# Patient Record
Sex: FEMALE | Race: WHITE | NOT HISPANIC OR LATINO | URBAN - METROPOLITAN AREA
[De-identification: names, ages, dates, MRNs, and addresses within clinical notes are randomized per-mention and may not be internally consistent; named-entity substitution may affect disease eponyms.]

---

## 2017-06-08 ENCOUNTER — FOLLOW UP (OUTPATIENT)
Dept: URBAN - METROPOLITAN AREA CLINIC 32 | Facility: CLINIC | Age: 62
End: 2017-06-08

## 2017-06-08 DIAGNOSIS — H35.3112: ICD-10-CM

## 2017-06-08 DIAGNOSIS — H35.373: ICD-10-CM

## 2017-06-08 DIAGNOSIS — H35.353: ICD-10-CM

## 2017-06-08 DIAGNOSIS — H35.3222: ICD-10-CM

## 2017-06-08 PROCEDURE — 92226 OPHTHALMOSCOPY (SUB): CPT

## 2017-06-08 PROCEDURE — 92134 CPTRZ OPH DX IMG PST SGM RTA: CPT

## 2017-06-08 PROCEDURE — 4177F TALK PT/CRGVR RE AREDS PREV: CPT

## 2017-06-08 PROCEDURE — 2019F DILATED MACUL EXAM DONE: CPT

## 2017-06-08 PROCEDURE — 1036F TOBACCO NON-USER: CPT

## 2017-06-08 PROCEDURE — C9257 BEVACIZUMAB INJECTION: HCPCS

## 2017-06-08 PROCEDURE — 92014 COMPRE OPH EXAM EST PT 1/>: CPT | Mod: 25

## 2017-06-08 PROCEDURE — 67028 INJECTION EYE DRUG: CPT

## 2017-06-08 ASSESSMENT — VISUAL ACUITY
OS_CC: 20/60-3
OD_CC: 20/30-2

## 2017-06-08 ASSESSMENT — TONOMETRY
OS_IOP_MMHG: 15
OD_IOP_MMHG: 16

## 2017-06-29 ENCOUNTER — FOLLOW UP (OUTPATIENT)
Dept: URBAN - METROPOLITAN AREA CLINIC 32 | Facility: CLINIC | Age: 62
End: 2017-06-29

## 2017-06-29 DIAGNOSIS — H35.3112: ICD-10-CM

## 2017-06-29 DIAGNOSIS — H35.373: ICD-10-CM

## 2017-06-29 DIAGNOSIS — H35.353: ICD-10-CM

## 2017-06-29 DIAGNOSIS — H35.3222: ICD-10-CM

## 2017-06-29 PROCEDURE — 92134 CPTRZ OPH DX IMG PST SGM RTA: CPT

## 2017-06-29 PROCEDURE — G8427 DOCREV CUR MEDS BY ELIG CLIN: HCPCS

## 2017-06-29 PROCEDURE — 1036F TOBACCO NON-USER: CPT

## 2017-06-29 PROCEDURE — 4177F TALK PT/CRGVR RE AREDS PREV: CPT

## 2017-06-29 PROCEDURE — 2019F DILATED MACUL EXAM DONE: CPT

## 2017-06-29 PROCEDURE — 92226 OPHTHALMOSCOPY (SUB): CPT

## 2017-06-29 PROCEDURE — 92014 COMPRE OPH EXAM EST PT 1/>: CPT

## 2017-06-29 PROCEDURE — 67028 INJECTION EYE DRUG: CPT

## 2017-06-29 ASSESSMENT — TONOMETRY
OS_IOP_MMHG: 21
OD_IOP_MMHG: 20

## 2017-06-29 ASSESSMENT — VISUAL ACUITY
OD_CC: 20/50-1
OS_CC: 20/100
OS_PH: 20/80

## 2017-07-06 ENCOUNTER — FOLLOW UP (OUTPATIENT)
Dept: URBAN - METROPOLITAN AREA CLINIC 32 | Facility: CLINIC | Age: 62
End: 2017-07-06

## 2017-07-06 DIAGNOSIS — H35.3112: ICD-10-CM

## 2017-07-06 DIAGNOSIS — H35.353: ICD-10-CM

## 2017-07-06 DIAGNOSIS — H35.3222: ICD-10-CM

## 2017-07-06 PROCEDURE — G8427 DOCREV CUR MEDS BY ELIG CLIN: HCPCS

## 2017-07-06 PROCEDURE — 92226 OPHTHALMOSCOPY (SUB): CPT

## 2017-07-06 PROCEDURE — 1036F TOBACCO NON-USER: CPT

## 2017-07-06 PROCEDURE — 92014 COMPRE OPH EXAM EST PT 1/>: CPT | Mod: 25

## 2017-07-06 PROCEDURE — 2019F DILATED MACUL EXAM DONE: CPT

## 2017-07-06 PROCEDURE — 4040F PNEUMOC VAC/ADMIN/RCVD: CPT | Mod: 8P

## 2017-07-06 PROCEDURE — 67028 INJECTION EYE DRUG: CPT

## 2017-07-06 PROCEDURE — 4177F TALK PT/CRGVR RE AREDS PREV: CPT

## 2017-07-06 PROCEDURE — 92134 CPTRZ OPH DX IMG PST SGM RTA: CPT

## 2017-07-06 ASSESSMENT — VISUAL ACUITY
OS_CC: 20/100-1
OS_PH: 20/80-3
OD_CC: 20/30-1

## 2017-07-06 ASSESSMENT — TONOMETRY
OS_IOP_MMHG: 14
OD_IOP_MMHG: 12

## 2017-07-13 ENCOUNTER — DIAGNOSTICS ONLY (OUTPATIENT)
Dept: URBAN - METROPOLITAN AREA CLINIC 32 | Facility: CLINIC | Age: 62
End: 2017-07-13

## 2017-07-13 DIAGNOSIS — H35.353: ICD-10-CM

## 2017-07-13 PROCEDURE — 92134 CPTRZ OPH DX IMG PST SGM RTA: CPT

## 2017-09-20 ENCOUNTER — FOLLOW UP (OUTPATIENT)
Dept: URBAN - METROPOLITAN AREA CLINIC 32 | Facility: CLINIC | Age: 62
End: 2017-09-20

## 2017-09-20 DIAGNOSIS — H35.353: ICD-10-CM

## 2017-09-20 DIAGNOSIS — H35.3112: ICD-10-CM

## 2017-09-20 DIAGNOSIS — H35.3222: ICD-10-CM

## 2017-09-20 DIAGNOSIS — H35.373: ICD-10-CM

## 2017-09-20 PROCEDURE — G8427 DOCREV CUR MEDS BY ELIG CLIN: HCPCS

## 2017-09-20 PROCEDURE — 2019F DILATED MACUL EXAM DONE: CPT

## 2017-09-20 PROCEDURE — 92134 CPTRZ OPH DX IMG PST SGM RTA: CPT

## 2017-09-20 PROCEDURE — 92014 COMPRE OPH EXAM EST PT 1/>: CPT

## 2017-09-20 PROCEDURE — 92226 OPHTHALMOSCOPY (SUB): CPT

## 2017-09-20 PROCEDURE — 1036F TOBACCO NON-USER: CPT

## 2017-09-20 PROCEDURE — 4177F TALK PT/CRGVR RE AREDS PREV: CPT

## 2017-09-20 ASSESSMENT — VISUAL ACUITY
OS_PH: 20/80
OS_CC: 20/100
OD_CC: 20/50
OD_PH: 20/40

## 2017-09-20 ASSESSMENT — TONOMETRY
OS_IOP_MMHG: 20
OD_IOP_MMHG: 20

## 2017-09-26 ENCOUNTER — FOLLOW UP (OUTPATIENT)
Dept: URBAN - METROPOLITAN AREA CLINIC 32 | Facility: CLINIC | Age: 62
End: 2017-09-26

## 2017-09-26 DIAGNOSIS — H35.353: ICD-10-CM

## 2017-09-26 PROCEDURE — 67028 INJECTION EYE DRUG: CPT

## 2017-09-26 PROCEDURE — 92134 CPTRZ OPH DX IMG PST SGM RTA: CPT

## 2017-09-26 ASSESSMENT — VISUAL ACUITY
OS_PH: 20/80-1
OS_CC: 20/125
OD_PH: 20/40-1
OD_CC: 20/50-2

## 2017-09-26 ASSESSMENT — TONOMETRY
OS_IOP_MMHG: 16
OD_IOP_MMHG: 15

## 2017-10-04 ENCOUNTER — FOLLOW UP (OUTPATIENT)
Dept: URBAN - METROPOLITAN AREA CLINIC 32 | Facility: CLINIC | Age: 62
End: 2017-10-04

## 2017-10-04 DIAGNOSIS — H35.341: ICD-10-CM

## 2017-10-04 DIAGNOSIS — H35.353: ICD-10-CM

## 2017-10-04 DIAGNOSIS — H35.373: ICD-10-CM

## 2017-10-04 PROCEDURE — 1036F TOBACCO NON-USER: CPT

## 2017-10-04 PROCEDURE — 92134 CPTRZ OPH DX IMG PST SGM RTA: CPT

## 2017-10-04 PROCEDURE — G8427 DOCREV CUR MEDS BY ELIG CLIN: HCPCS

## 2017-10-04 PROCEDURE — 92226 OPHTHALMOSCOPY (SUB): CPT

## 2017-10-04 PROCEDURE — 67028 INJECTION EYE DRUG: CPT

## 2017-10-04 PROCEDURE — 92014 COMPRE OPH EXAM EST PT 1/>: CPT | Mod: 25

## 2017-10-04 ASSESSMENT — TONOMETRY
OS_IOP_MMHG: 20
OD_IOP_MMHG: 20

## 2017-10-04 ASSESSMENT — VISUAL ACUITY
OS_CC: 20/100
OD_CC: 20/50
OS_PH: 20/60
OD_PH: 20/40

## 2017-11-15 ENCOUNTER — FOLLOW UP (OUTPATIENT)
Dept: URBAN - METROPOLITAN AREA CLINIC 32 | Facility: CLINIC | Age: 62
End: 2017-11-15

## 2017-11-15 DIAGNOSIS — H35.353: ICD-10-CM

## 2017-11-15 DIAGNOSIS — H35.373: ICD-10-CM

## 2017-11-15 DIAGNOSIS — H35.341: ICD-10-CM

## 2017-11-15 PROCEDURE — 92134 CPTRZ OPH DX IMG PST SGM RTA: CPT

## 2017-11-15 PROCEDURE — 92226 OPHTHALMOSCOPY (SUB): CPT

## 2017-11-15 PROCEDURE — 1036F TOBACCO NON-USER: CPT

## 2017-11-15 PROCEDURE — G8427 DOCREV CUR MEDS BY ELIG CLIN: HCPCS

## 2017-11-15 PROCEDURE — 92014 COMPRE OPH EXAM EST PT 1/>: CPT | Mod: 25

## 2017-11-15 PROCEDURE — 67515 INJECT/TREAT EYE SOCKET: CPT

## 2017-11-15 ASSESSMENT — TONOMETRY
OS_IOP_MMHG: 17
OD_IOP_MMHG: 17

## 2017-11-15 ASSESSMENT — VISUAL ACUITY
OS_CC: 20/80
OD_CC: 20/60-3

## 2017-11-20 ENCOUNTER — SURGERY/PROCEDURE (OUTPATIENT)
Age: 62
End: 2017-11-20

## 2017-11-20 DIAGNOSIS — H35.373: ICD-10-CM

## 2017-11-20 DIAGNOSIS — H35.341: ICD-10-CM

## 2017-11-20 PROCEDURE — 67042 VIT FOR MACULAR HOLE: CPT

## 2017-11-21 ENCOUNTER — FOLLOW UP (OUTPATIENT)
Dept: URBAN - METROPOLITAN AREA CLINIC 14 | Facility: CLINIC | Age: 62
End: 2017-11-21

## 2017-11-21 DIAGNOSIS — H35.373: ICD-10-CM

## 2017-11-21 DIAGNOSIS — H35.341: ICD-10-CM

## 2017-11-21 PROCEDURE — 99024 POSTOP FOLLOW-UP VISIT: CPT

## 2017-11-21 PROCEDURE — 92226 OPHTHALMOSCOPY (SUB): CPT

## 2017-11-21 ASSESSMENT — TONOMETRY
OS_IOP_MMHG: 16
OD_IOP_MMHG: 6
OD_IOP_MMHG: 8

## 2017-11-21 ASSESSMENT — VISUAL ACUITY: OS_CC: 20/125

## 2017-11-28 ENCOUNTER — FOLLOW UP (OUTPATIENT)
Dept: URBAN - METROPOLITAN AREA CLINIC 14 | Facility: CLINIC | Age: 62
End: 2017-11-28

## 2017-11-28 DIAGNOSIS — H35.341: ICD-10-CM

## 2017-11-28 DIAGNOSIS — H35.373: ICD-10-CM

## 2017-11-28 PROCEDURE — 92134 CPTRZ OPH DX IMG PST SGM RTA: CPT

## 2017-11-28 PROCEDURE — 99024 POSTOP FOLLOW-UP VISIT: CPT

## 2017-11-28 PROCEDURE — 92226 OPHTHALMOSCOPY (SUB): CPT

## 2017-11-28 ASSESSMENT — VISUAL ACUITY
OS_PH: 20/80-2
OD_CC: 20/125
OS_CC: 20/125

## 2017-11-28 ASSESSMENT — TONOMETRY
OS_IOP_MMHG: 16
OD_IOP_MMHG: 13

## 2017-12-05 ENCOUNTER — FOLLOW UP (OUTPATIENT)
Dept: URBAN - METROPOLITAN AREA CLINIC 14 | Facility: CLINIC | Age: 62
End: 2017-12-05

## 2017-12-05 DIAGNOSIS — H35.353: ICD-10-CM

## 2017-12-05 DIAGNOSIS — H35.373: ICD-10-CM

## 2017-12-05 DIAGNOSIS — H35.341: ICD-10-CM

## 2017-12-05 PROCEDURE — 99024 POSTOP FOLLOW-UP VISIT: CPT

## 2017-12-05 PROCEDURE — 92226 OPHTHALMOSCOPY (SUB): CPT

## 2017-12-05 PROCEDURE — 92134 CPTRZ OPH DX IMG PST SGM RTA: CPT

## 2017-12-05 ASSESSMENT — TONOMETRY
OD_IOP_MMHG: 21
OS_IOP_MMHG: 20

## 2017-12-05 ASSESSMENT — VISUAL ACUITY
OS_CC: 20/100-2
OD_CC: 20/100
OD_PH: 20/100+2
OS_PH: 20/50-3

## 2017-12-19 ENCOUNTER — POST-OP CHECK (OUTPATIENT)
Dept: URBAN - METROPOLITAN AREA CLINIC 14 | Facility: CLINIC | Age: 62
End: 2017-12-19

## 2017-12-19 DIAGNOSIS — H35.353: ICD-10-CM

## 2017-12-19 DIAGNOSIS — H35.341: ICD-10-CM

## 2017-12-19 DIAGNOSIS — H35.3222: ICD-10-CM

## 2017-12-19 DIAGNOSIS — H35.3112: ICD-10-CM

## 2017-12-19 DIAGNOSIS — H43.812: ICD-10-CM

## 2017-12-19 DIAGNOSIS — H35.373: ICD-10-CM

## 2017-12-19 PROCEDURE — 99024 POSTOP FOLLOW-UP VISIT: CPT

## 2017-12-19 PROCEDURE — 92134 CPTRZ OPH DX IMG PST SGM RTA: CPT

## 2017-12-19 PROCEDURE — 92226 OPHTHALMOSCOPY (SUB): CPT

## 2017-12-19 ASSESSMENT — VISUAL ACUITY
OS_PH: 20/80
OS_CC: 20/125
OD_CC: 20/100+2
OD_PH: 20/60

## 2017-12-19 ASSESSMENT — TONOMETRY
OS_IOP_MMHG: 20
OD_IOP_MMHG: 22

## 2018-05-16 ENCOUNTER — POST-OP CHECK (OUTPATIENT)
Dept: URBAN - METROPOLITAN AREA CLINIC 32 | Facility: CLINIC | Age: 63
End: 2018-05-16

## 2018-05-16 DIAGNOSIS — H35.341: ICD-10-CM

## 2018-05-16 DIAGNOSIS — H35.353: ICD-10-CM

## 2018-05-16 DIAGNOSIS — H35.373: ICD-10-CM

## 2018-05-16 PROCEDURE — 99024 POSTOP FOLLOW-UP VISIT: CPT | Mod: 25

## 2018-05-16 PROCEDURE — 92226 OPHTHALMOSCOPY (SUB): CPT

## 2018-05-16 PROCEDURE — 67515 INJECT/TREAT EYE SOCKET: CPT

## 2018-05-16 PROCEDURE — 92134 CPTRZ OPH DX IMG PST SGM RTA: CPT

## 2018-05-16 ASSESSMENT — VISUAL ACUITY
OS_SC: 20/50-1
OD_SC: 20/50-1
OD_PH: 20/40-3

## 2018-05-16 ASSESSMENT — TONOMETRY
OD_IOP_MMHG: 18
OS_IOP_MMHG: 21

## 2018-05-29 ENCOUNTER — FOLLOW UP (OUTPATIENT)
Dept: URBAN - METROPOLITAN AREA CLINIC 14 | Facility: CLINIC | Age: 63
End: 2018-05-29

## 2018-05-29 DIAGNOSIS — H35.341: ICD-10-CM

## 2018-05-29 DIAGNOSIS — H35.353: ICD-10-CM

## 2018-05-29 DIAGNOSIS — H35.373: ICD-10-CM

## 2018-05-29 PROCEDURE — 1036F TOBACCO NON-USER: CPT

## 2018-05-29 PROCEDURE — 92226 OPHTHALMOSCOPY (SUB): CPT

## 2018-05-29 PROCEDURE — 67515 INJECT/TREAT EYE SOCKET: CPT

## 2018-05-29 PROCEDURE — 92134 CPTRZ OPH DX IMG PST SGM RTA: CPT

## 2018-05-29 PROCEDURE — 4040F PNEUMOC VAC/ADMIN/RCVD: CPT | Mod: 8P

## 2018-05-29 PROCEDURE — 92014 COMPRE OPH EXAM EST PT 1/>: CPT | Mod: 25

## 2018-05-29 PROCEDURE — G8427 DOCREV CUR MEDS BY ELIG CLIN: HCPCS

## 2018-05-29 ASSESSMENT — VISUAL ACUITY
OS_SC: 20/50
OD_SC: 20/25

## 2018-05-29 ASSESSMENT — TONOMETRY
OD_IOP_MMHG: 20
OS_IOP_MMHG: 17

## 2018-06-12 ENCOUNTER — FOLLOW UP (OUTPATIENT)
Dept: URBAN - METROPOLITAN AREA CLINIC 14 | Facility: CLINIC | Age: 63
End: 2018-06-12

## 2018-06-12 DIAGNOSIS — H43.812: ICD-10-CM

## 2018-06-12 DIAGNOSIS — H35.3222: ICD-10-CM

## 2018-06-12 DIAGNOSIS — H35.373: ICD-10-CM

## 2018-06-12 DIAGNOSIS — H35.341: ICD-10-CM

## 2018-06-12 DIAGNOSIS — H35.353: ICD-10-CM

## 2018-06-12 DIAGNOSIS — H35.3112: ICD-10-CM

## 2018-06-12 PROCEDURE — 1036F TOBACCO NON-USER: CPT

## 2018-06-12 PROCEDURE — G8427 DOCREV CUR MEDS BY ELIG CLIN: HCPCS

## 2018-06-12 PROCEDURE — 4177F TALK PT/CRGVR RE AREDS PREV: CPT

## 2018-06-12 PROCEDURE — 92014 COMPRE OPH EXAM EST PT 1/>: CPT | Mod: 25

## 2018-06-12 PROCEDURE — 2019F DILATED MACUL EXAM DONE: CPT

## 2018-06-12 PROCEDURE — 92226 OPHTHALMOSCOPY (SUB): CPT

## 2018-06-12 PROCEDURE — 67515 INJECT/TREAT EYE SOCKET: CPT

## 2018-06-12 PROCEDURE — 92134 CPTRZ OPH DX IMG PST SGM RTA: CPT

## 2018-06-12 ASSESSMENT — TONOMETRY
OD_IOP_MMHG: 18
OS_IOP_MMHG: 13

## 2018-06-12 ASSESSMENT — VISUAL ACUITY
OD_SC: 20/30-3
OS_SC: 20/50

## 2018-06-26 ENCOUNTER — FOLLOW UP (OUTPATIENT)
Dept: URBAN - METROPOLITAN AREA CLINIC 14 | Facility: CLINIC | Age: 63
End: 2018-06-26

## 2018-06-26 DIAGNOSIS — H35.353: ICD-10-CM

## 2018-06-26 DIAGNOSIS — H35.3222: ICD-10-CM

## 2018-06-26 DIAGNOSIS — H43.812: ICD-10-CM

## 2018-06-26 DIAGNOSIS — H35.373: ICD-10-CM

## 2018-06-26 DIAGNOSIS — H35.3112: ICD-10-CM

## 2018-06-26 DIAGNOSIS — H35.341: ICD-10-CM

## 2018-06-26 PROCEDURE — 1036F TOBACCO NON-USER: CPT

## 2018-06-26 PROCEDURE — 4177F TALK PT/CRGVR RE AREDS PREV: CPT

## 2018-06-26 PROCEDURE — 92134 CPTRZ OPH DX IMG PST SGM RTA: CPT

## 2018-06-26 PROCEDURE — 4040F PNEUMOC VAC/ADMIN/RCVD: CPT | Mod: 8P

## 2018-06-26 PROCEDURE — G8427 DOCREV CUR MEDS BY ELIG CLIN: HCPCS

## 2018-06-26 PROCEDURE — 92014 COMPRE OPH EXAM EST PT 1/>: CPT

## 2018-06-26 PROCEDURE — 92226 OPHTHALMOSCOPY (SUB): CPT

## 2018-06-26 PROCEDURE — 2019F DILATED MACUL EXAM DONE: CPT

## 2018-06-26 PROCEDURE — 67515 INJECT/TREAT EYE SOCKET: CPT

## 2018-06-26 ASSESSMENT — VISUAL ACUITY
OS_SC: 20/30-1
OD_SC: 20/30+3

## 2018-06-26 ASSESSMENT — TONOMETRY
OD_IOP_MMHG: 20
OS_IOP_MMHG: 18

## 2018-07-10 ENCOUNTER — FOLLOW UP (OUTPATIENT)
Dept: URBAN - METROPOLITAN AREA CLINIC 14 | Facility: CLINIC | Age: 63
End: 2018-07-10

## 2018-07-10 DIAGNOSIS — H44.113: ICD-10-CM

## 2018-07-10 DIAGNOSIS — H35.3222: ICD-10-CM

## 2018-07-10 DIAGNOSIS — H35.3112: ICD-10-CM

## 2018-07-10 DIAGNOSIS — H35.353: ICD-10-CM

## 2018-07-10 PROCEDURE — 4177F TALK PT/CRGVR RE AREDS PREV: CPT

## 2018-07-10 PROCEDURE — G8427 DOCREV CUR MEDS BY ELIG CLIN: HCPCS

## 2018-07-10 PROCEDURE — 1036F TOBACCO NON-USER: CPT

## 2018-07-10 PROCEDURE — 92134 CPTRZ OPH DX IMG PST SGM RTA: CPT

## 2018-07-10 PROCEDURE — 92226 OPHTHALMOSCOPY (SUB): CPT

## 2018-07-10 PROCEDURE — 67028 INJECTION EYE DRUG: CPT

## 2018-07-10 PROCEDURE — 2019F DILATED MACUL EXAM DONE: CPT

## 2018-07-10 PROCEDURE — 92012 INTRM OPH EXAM EST PATIENT: CPT | Mod: 25

## 2018-07-10 ASSESSMENT — VISUAL ACUITY
OS_SC: 20/30-2
OD_SC: 20/25-1

## 2018-07-10 ASSESSMENT — TONOMETRY
OS_IOP_MMHG: 15
OD_IOP_MMHG: 16

## 2018-08-09 ENCOUNTER — FOLLOW UP (OUTPATIENT)
Dept: URBAN - METROPOLITAN AREA CLINIC 32 | Facility: CLINIC | Age: 63
End: 2018-08-09

## 2018-08-09 DIAGNOSIS — H35.353: ICD-10-CM

## 2018-08-09 DIAGNOSIS — H35.3222: ICD-10-CM

## 2018-08-09 DIAGNOSIS — H44.113: ICD-10-CM

## 2018-08-09 DIAGNOSIS — H35.3112: ICD-10-CM

## 2018-08-09 PROCEDURE — 1036F TOBACCO NON-USER: CPT

## 2018-08-09 PROCEDURE — 92226 OPHTHALMOSCOPY (SUB): CPT

## 2018-08-09 PROCEDURE — G8427 DOCREV CUR MEDS BY ELIG CLIN: HCPCS

## 2018-08-09 PROCEDURE — 2019F DILATED MACUL EXAM DONE: CPT

## 2018-08-09 PROCEDURE — 92014 COMPRE OPH EXAM EST PT 1/>: CPT

## 2018-08-09 PROCEDURE — 92134 CPTRZ OPH DX IMG PST SGM RTA: CPT

## 2018-08-09 PROCEDURE — 4177F TALK PT/CRGVR RE AREDS PREV: CPT

## 2018-08-09 ASSESSMENT — TONOMETRY
OD_IOP_MMHG: 23
OD_IOP_MMHG: 25
OS_IOP_MMHG: 22

## 2018-08-09 ASSESSMENT — VISUAL ACUITY
OD_SC: 20/25+2
OS_PH: 20/20-1
OS_SC: 20/30-3

## 2018-09-18 ENCOUNTER — FOLLOW UP (OUTPATIENT)
Dept: URBAN - METROPOLITAN AREA CLINIC 32 | Facility: CLINIC | Age: 63
End: 2018-09-18

## 2018-09-18 DIAGNOSIS — H35.3112: ICD-10-CM

## 2018-09-18 DIAGNOSIS — H35.3222: ICD-10-CM

## 2018-09-18 DIAGNOSIS — H35.353: ICD-10-CM

## 2018-09-18 DIAGNOSIS — H44.113: ICD-10-CM

## 2018-09-18 PROCEDURE — 92134 CPTRZ OPH DX IMG PST SGM RTA: CPT

## 2018-09-18 PROCEDURE — G9903 PT SCRN TBCO ID AS NON USER: HCPCS

## 2018-09-18 PROCEDURE — 92014 COMPRE OPH EXAM EST PT 1/>: CPT | Mod: 25

## 2018-09-18 PROCEDURE — G8427 DOCREV CUR MEDS BY ELIG CLIN: HCPCS

## 2018-09-18 PROCEDURE — 4177F TALK PT/CRGVR RE AREDS PREV: CPT

## 2018-09-18 PROCEDURE — 92226 OPHTHALMOSCOPY (SUB): CPT

## 2018-09-18 PROCEDURE — G9974 MAC EXAM PERF: HCPCS

## 2018-09-18 PROCEDURE — 67028 INJECTION EYE DRUG: CPT

## 2018-09-18 PROCEDURE — 1036F TOBACCO NON-USER: CPT

## 2018-09-18 ASSESSMENT — TONOMETRY
OS_IOP_MMHG: 23
OD_IOP_MMHG: 22

## 2018-09-18 ASSESSMENT — VISUAL ACUITY
OS_SC: 20/60+3
OD_SC: 20/40+2

## 2018-10-16 ENCOUNTER — FOLLOW UP (OUTPATIENT)
Dept: URBAN - METROPOLITAN AREA CLINIC 32 | Facility: CLINIC | Age: 63
End: 2018-10-16

## 2018-10-16 DIAGNOSIS — H44.113: ICD-10-CM

## 2018-10-16 DIAGNOSIS — H35.3222: ICD-10-CM

## 2018-10-16 DIAGNOSIS — H35.353: ICD-10-CM

## 2018-10-16 DIAGNOSIS — H35.3112: ICD-10-CM

## 2018-10-16 PROCEDURE — 92226 OPHTHALMOSCOPY (SUB): CPT

## 2018-10-16 PROCEDURE — 1036F TOBACCO NON-USER: CPT

## 2018-10-16 PROCEDURE — 92014 COMPRE OPH EXAM EST PT 1/>: CPT

## 2018-10-16 PROCEDURE — 92134 CPTRZ OPH DX IMG PST SGM RTA: CPT

## 2018-10-16 PROCEDURE — 4177F TALK PT/CRGVR RE AREDS PREV: CPT

## 2018-10-16 ASSESSMENT — VISUAL ACUITY
OD_SC: 20/40+1
OS_SC: 20/60
OD_PH: 20/30-3
OS_PH: 20/50+4

## 2018-10-16 ASSESSMENT — TONOMETRY
OS_IOP_MMHG: 16
OD_IOP_MMHG: 19

## 2018-11-14 ENCOUNTER — FOLLOW UP (OUTPATIENT)
Dept: URBAN - METROPOLITAN AREA CLINIC 32 | Facility: CLINIC | Age: 63
End: 2018-11-14

## 2018-11-14 DIAGNOSIS — H44.113: ICD-10-CM

## 2018-11-14 DIAGNOSIS — H35.353: ICD-10-CM

## 2018-11-14 DIAGNOSIS — H35.3222: ICD-10-CM

## 2018-11-14 DIAGNOSIS — H35.3112: ICD-10-CM

## 2018-11-14 PROCEDURE — 92134 CPTRZ OPH DX IMG PST SGM RTA: CPT

## 2018-11-14 PROCEDURE — 67028 INJECTION EYE DRUG: CPT

## 2018-11-14 PROCEDURE — 1036F TOBACCO NON-USER: CPT

## 2018-11-14 PROCEDURE — 92226 OPHTHALMOSCOPY (SUB): CPT

## 2018-11-14 PROCEDURE — 4177F TALK PT/CRGVR RE AREDS PREV: CPT

## 2018-11-14 PROCEDURE — 92014 COMPRE OPH EXAM EST PT 1/>: CPT | Mod: 25

## 2018-11-14 ASSESSMENT — VISUAL ACUITY
OS_SC: 20/60+3
OD_SC: 20/40+1
OD_PH: 20/30-3
OS_PH: 20/50-2

## 2018-11-14 ASSESSMENT — TONOMETRY
OS_IOP_MMHG: 18
OD_IOP_MMHG: 18

## 2018-12-13 ENCOUNTER — FOLLOW UP (OUTPATIENT)
Dept: URBAN - METROPOLITAN AREA CLINIC 32 | Facility: CLINIC | Age: 63
End: 2018-12-13

## 2018-12-13 DIAGNOSIS — H35.3112: ICD-10-CM

## 2018-12-13 DIAGNOSIS — H44.113: ICD-10-CM

## 2018-12-13 DIAGNOSIS — H35.353: ICD-10-CM

## 2018-12-13 DIAGNOSIS — H35.3222: ICD-10-CM

## 2018-12-13 PROCEDURE — 92134 CPTRZ OPH DX IMG PST SGM RTA: CPT

## 2018-12-13 PROCEDURE — 4177F TALK PT/CRGVR RE AREDS PREV: CPT

## 2018-12-13 PROCEDURE — G9903 PT SCRN TBCO ID AS NON USER: HCPCS

## 2018-12-13 PROCEDURE — 92014 COMPRE OPH EXAM EST PT 1/>: CPT

## 2018-12-13 PROCEDURE — G9974 MAC EXAM PERF: HCPCS

## 2018-12-13 PROCEDURE — G8427 DOCREV CUR MEDS BY ELIG CLIN: HCPCS

## 2018-12-13 PROCEDURE — 1036F TOBACCO NON-USER: CPT

## 2018-12-13 PROCEDURE — 92226 OPHTHALMOSCOPY (SUB): CPT

## 2018-12-13 ASSESSMENT — VISUAL ACUITY
OD_PH: 20/30
OD_SC: 20/40-1
OS_SC: 20/40

## 2018-12-13 ASSESSMENT — TONOMETRY
OS_IOP_MMHG: 19
OD_IOP_MMHG: 22

## 2019-01-10 ENCOUNTER — FOLLOW UP (OUTPATIENT)
Dept: URBAN - METROPOLITAN AREA CLINIC 32 | Facility: CLINIC | Age: 64
End: 2019-01-10

## 2019-01-10 DIAGNOSIS — H35.353: ICD-10-CM

## 2019-01-10 DIAGNOSIS — H44.113: ICD-10-CM

## 2019-01-10 DIAGNOSIS — H35.3112: ICD-10-CM

## 2019-01-10 DIAGNOSIS — H35.3222: ICD-10-CM

## 2019-01-10 PROCEDURE — 92014 COMPRE OPH EXAM EST PT 1/>: CPT

## 2019-01-10 PROCEDURE — G9974 MAC EXAM PERF: HCPCS

## 2019-01-10 PROCEDURE — 4177F TALK PT/CRGVR RE AREDS PREV: CPT

## 2019-01-10 PROCEDURE — 92226 OPHTHALMOSCOPY (SUB): CPT

## 2019-01-10 PROCEDURE — 1036F TOBACCO NON-USER: CPT

## 2019-01-10 PROCEDURE — G9903 PT SCRN TBCO ID AS NON USER: HCPCS

## 2019-01-10 PROCEDURE — 92134 CPTRZ OPH DX IMG PST SGM RTA: CPT

## 2019-01-10 PROCEDURE — G8427 DOCREV CUR MEDS BY ELIG CLIN: HCPCS

## 2019-01-10 ASSESSMENT — VISUAL ACUITY
OD_PH: 20/30-2
OD_SC: 20/40+3
OS_SC: 20/50-2
OS_PH: 20/30-2

## 2019-01-10 ASSESSMENT — TONOMETRY
OD_IOP_MMHG: 16
OS_IOP_MMHG: 15

## 2019-05-17 ENCOUNTER — FOLLOW UP (OUTPATIENT)
Dept: URBAN - METROPOLITAN AREA CLINIC 19 | Facility: CLINIC | Age: 64
End: 2019-05-17

## 2019-05-17 DIAGNOSIS — H44.113: ICD-10-CM

## 2019-05-17 DIAGNOSIS — H35.353: ICD-10-CM

## 2019-05-17 DIAGNOSIS — H35.3112: ICD-10-CM

## 2019-05-17 DIAGNOSIS — H35.3222: ICD-10-CM

## 2019-05-17 PROCEDURE — 92226 OPHTHALMOSCOPY (SUB): CPT

## 2019-05-17 PROCEDURE — 92134 CPTRZ OPH DX IMG PST SGM RTA: CPT

## 2019-05-17 PROCEDURE — 92014 COMPRE OPH EXAM EST PT 1/>: CPT

## 2019-05-17 ASSESSMENT — TONOMETRY
OD_IOP_MMHG: 19
OS_IOP_MMHG: 14

## 2019-05-17 ASSESSMENT — VISUAL ACUITY
OD_PH: 20/25-2
OS_SC: 20/40-3
OD_SC: 20/30+3

## 2019-06-18 ENCOUNTER — FOLLOW UP (OUTPATIENT)
Dept: URBAN - METROPOLITAN AREA CLINIC 32 | Facility: CLINIC | Age: 64
End: 2019-06-18

## 2019-06-18 DIAGNOSIS — H35.3222: ICD-10-CM

## 2019-06-18 DIAGNOSIS — H35.353: ICD-10-CM

## 2019-06-18 DIAGNOSIS — H35.3112: ICD-10-CM

## 2019-06-18 DIAGNOSIS — H44.113: ICD-10-CM

## 2019-06-18 PROCEDURE — 92134 CPTRZ OPH DX IMG PST SGM RTA: CPT

## 2019-06-18 PROCEDURE — 92226 OPHTHALMOSCOPY (SUB): CPT

## 2019-06-18 PROCEDURE — 92014 COMPRE OPH EXAM EST PT 1/>: CPT

## 2019-06-18 ASSESSMENT — VISUAL ACUITY
OD_PH: 20/30+3
OD_SC: 20/30-3
OS_PH: 20/50+3
OS_SC: 20/50+2

## 2019-06-18 ASSESSMENT — TONOMETRY
OD_IOP_MMHG: 16
OS_IOP_MMHG: 18

## 2019-07-23 ENCOUNTER — FOLLOW UP (OUTPATIENT)
Dept: URBAN - METROPOLITAN AREA CLINIC 32 | Facility: CLINIC | Age: 64
End: 2019-07-23

## 2019-07-23 DIAGNOSIS — H35.3112: ICD-10-CM

## 2019-07-23 DIAGNOSIS — H44.113: ICD-10-CM

## 2019-07-23 DIAGNOSIS — H35.353: ICD-10-CM

## 2019-07-23 DIAGNOSIS — H35.3222: ICD-10-CM

## 2019-07-23 PROCEDURE — 92014 COMPRE OPH EXAM EST PT 1/>: CPT

## 2019-07-23 PROCEDURE — 92226 OPHTHALMOSCOPY (SUB): CPT

## 2019-07-23 PROCEDURE — 92134 CPTRZ OPH DX IMG PST SGM RTA: CPT

## 2019-07-23 ASSESSMENT — VISUAL ACUITY
OS_PH: 20/40+3
OD_SC: 20/25-4
OD_PH: 20/25+4
OS_SC: 20/40-2

## 2019-07-23 ASSESSMENT — TONOMETRY
OS_IOP_MMHG: 22
OD_IOP_MMHG: 19

## 2019-08-29 ENCOUNTER — FOLLOW UP (OUTPATIENT)
Dept: URBAN - METROPOLITAN AREA CLINIC 32 | Facility: CLINIC | Age: 64
End: 2019-08-29

## 2019-08-29 DIAGNOSIS — H35.3112: ICD-10-CM

## 2019-08-29 DIAGNOSIS — H35.353: ICD-10-CM

## 2019-08-29 DIAGNOSIS — H35.373: ICD-10-CM

## 2019-08-29 DIAGNOSIS — H35.3222: ICD-10-CM

## 2019-08-29 DIAGNOSIS — H43.812: ICD-10-CM

## 2019-08-29 DIAGNOSIS — H35.341: ICD-10-CM

## 2019-08-29 DIAGNOSIS — H44.113: ICD-10-CM

## 2019-08-29 PROCEDURE — 92014 COMPRE OPH EXAM EST PT 1/>: CPT | Mod: 25

## 2019-08-29 PROCEDURE — 67028 INJECTION EYE DRUG: CPT

## 2019-08-29 PROCEDURE — 92226 OPHTHALMOSCOPY (SUB): CPT

## 2019-08-29 PROCEDURE — 92134 CPTRZ OPH DX IMG PST SGM RTA: CPT

## 2019-08-29 ASSESSMENT — VISUAL ACUITY
OS_SC: 20/60-1
OD_SC: 20/30-2
OS_PH: 20/50

## 2019-08-29 ASSESSMENT — TONOMETRY
OS_IOP_MMHG: 18
OD_IOP_MMHG: 19

## 2019-10-08 ENCOUNTER — FOLLOW UP (OUTPATIENT)
Dept: URBAN - METROPOLITAN AREA CLINIC 32 | Facility: CLINIC | Age: 64
End: 2019-10-08

## 2019-10-08 DIAGNOSIS — H35.3112: ICD-10-CM

## 2019-10-08 DIAGNOSIS — H44.113: ICD-10-CM

## 2019-10-08 DIAGNOSIS — H35.341: ICD-10-CM

## 2019-10-08 DIAGNOSIS — H35.353: ICD-10-CM

## 2019-10-08 DIAGNOSIS — H35.3222: ICD-10-CM

## 2019-10-08 PROCEDURE — 92226 OPHTHALMOSCOPY (SUB): CPT

## 2019-10-08 PROCEDURE — 92014 COMPRE OPH EXAM EST PT 1/>: CPT

## 2019-10-08 PROCEDURE — 92134 CPTRZ OPH DX IMG PST SGM RTA: CPT

## 2019-10-08 ASSESSMENT — VISUAL ACUITY
OD_PH: 20/25
OS_PH: 20/30+2
OS_SC: 20/30-1
OD_SC: 20/30-2

## 2019-10-08 ASSESSMENT — TONOMETRY
OS_IOP_MMHG: 24
OD_IOP_MMHG: 20

## 2019-12-10 ENCOUNTER — FOLLOW UP (OUTPATIENT)
Dept: URBAN - METROPOLITAN AREA CLINIC 32 | Facility: CLINIC | Age: 64
End: 2019-12-10

## 2019-12-10 DIAGNOSIS — H44.113: ICD-10-CM

## 2019-12-10 DIAGNOSIS — H35.373: ICD-10-CM

## 2019-12-10 DIAGNOSIS — H43.812: ICD-10-CM

## 2019-12-10 DIAGNOSIS — H35.3222: ICD-10-CM

## 2019-12-10 DIAGNOSIS — H35.341: ICD-10-CM

## 2019-12-10 DIAGNOSIS — H35.353: ICD-10-CM

## 2019-12-10 DIAGNOSIS — H35.3112: ICD-10-CM

## 2019-12-10 PROCEDURE — 92014 COMPRE OPH EXAM EST PT 1/>: CPT | Mod: 25

## 2019-12-10 PROCEDURE — 67028 INJECTION EYE DRUG: CPT

## 2019-12-10 PROCEDURE — 92134 CPTRZ OPH DX IMG PST SGM RTA: CPT

## 2019-12-10 PROCEDURE — 92226 OPHTHALMOSCOPY (SUB): CPT

## 2019-12-10 ASSESSMENT — TONOMETRY
OD_IOP_MMHG: 19
OS_IOP_MMHG: 18

## 2019-12-10 ASSESSMENT — VISUAL ACUITY
OS_PH: 20/40
OD_PH: 20/25
OS_SC: 20/40-1
OD_SC: 20/30

## 2020-05-26 ENCOUNTER — FOLLOW UP (OUTPATIENT)
Dept: URBAN - METROPOLITAN AREA CLINIC 32 | Facility: CLINIC | Age: 65
End: 2020-05-26

## 2020-05-26 DIAGNOSIS — H35.353: ICD-10-CM

## 2020-05-26 DIAGNOSIS — H35.3112: ICD-10-CM

## 2020-05-26 DIAGNOSIS — H35.341: ICD-10-CM

## 2020-05-26 DIAGNOSIS — H44.113: ICD-10-CM

## 2020-05-26 DIAGNOSIS — H35.3222: ICD-10-CM

## 2020-05-26 DIAGNOSIS — H30.93: ICD-10-CM

## 2020-05-26 PROCEDURE — 92202 OPSCPY EXTND ON/MAC DRAW: CPT | Mod: 59

## 2020-05-26 PROCEDURE — 92134 CPTRZ OPH DX IMG PST SGM RTA: CPT

## 2020-05-26 PROCEDURE — 92014 COMPRE OPH EXAM EST PT 1/>: CPT | Mod: 25

## 2020-05-26 PROCEDURE — 67028 INJECTION EYE DRUG: CPT

## 2020-05-26 ASSESSMENT — TONOMETRY
OS_IOP_MMHG: 18
OD_IOP_MMHG: 20

## 2020-05-26 ASSESSMENT — VISUAL ACUITY
OD_PH: 20/25-2
OD_SC: 20/30-1
OS_PH: 20/40+2
OS_SC: 20/50-3

## 2020-07-21 ENCOUNTER — FOLLOW UP (OUTPATIENT)
Dept: URBAN - METROPOLITAN AREA CLINIC 32 | Facility: CLINIC | Age: 65
End: 2020-07-21

## 2020-07-21 DIAGNOSIS — H35.341: ICD-10-CM

## 2020-07-21 DIAGNOSIS — H30.93: ICD-10-CM

## 2020-07-21 DIAGNOSIS — H35.3112: ICD-10-CM

## 2020-07-21 DIAGNOSIS — H35.353: ICD-10-CM

## 2020-07-21 DIAGNOSIS — H44.113: ICD-10-CM

## 2020-07-21 DIAGNOSIS — H35.3222: ICD-10-CM

## 2020-07-21 PROCEDURE — 92134 CPTRZ OPH DX IMG PST SGM RTA: CPT

## 2020-07-21 PROCEDURE — 92202 OPSCPY EXTND ON/MAC DRAW: CPT

## 2020-07-21 PROCEDURE — 92014 COMPRE OPH EXAM EST PT 1/>: CPT | Mod: 25

## 2020-07-21 PROCEDURE — 67028 INJECTION EYE DRUG: CPT

## 2020-07-21 ASSESSMENT — VISUAL ACUITY
OS_SC: 20/50-2
OD_SC: 20/40-1
OD_PH: 20/30
OS_PH: 20/40-1

## 2020-07-21 ASSESSMENT — TONOMETRY
OS_IOP_MMHG: 20
OD_IOP_MMHG: 24

## 2020-09-01 ENCOUNTER — FOLLOW UP (OUTPATIENT)
Dept: URBAN - METROPOLITAN AREA CLINIC 32 | Facility: CLINIC | Age: 65
End: 2020-09-01

## 2020-09-01 VITALS — HEIGHT: 55 IN

## 2020-09-01 DIAGNOSIS — H35.3222: ICD-10-CM

## 2020-09-01 DIAGNOSIS — H35.341: ICD-10-CM

## 2020-09-01 DIAGNOSIS — H44.113: ICD-10-CM

## 2020-09-01 DIAGNOSIS — H30.93: ICD-10-CM

## 2020-09-01 DIAGNOSIS — H35.353: ICD-10-CM

## 2020-09-01 DIAGNOSIS — H35.3112: ICD-10-CM

## 2020-09-01 PROCEDURE — 92134 CPTRZ OPH DX IMG PST SGM RTA: CPT

## 2020-09-01 PROCEDURE — 92202 OPSCPY EXTND ON/MAC DRAW: CPT

## 2020-09-01 PROCEDURE — 92014 COMPRE OPH EXAM EST PT 1/>: CPT

## 2020-09-01 ASSESSMENT — VISUAL ACUITY
OD_SC: 20/25-3
OS_PH: 20/25-2
OD_PH: 20/25
OS_SC: 20/30-3

## 2020-09-01 ASSESSMENT — TONOMETRY
OD_IOP_MMHG: 23
OS_IOP_MMHG: 22

## 2020-10-27 ENCOUNTER — FOLLOW UP (OUTPATIENT)
Dept: URBAN - METROPOLITAN AREA CLINIC 32 | Facility: CLINIC | Age: 65
End: 2020-10-27

## 2020-10-27 VITALS — HEIGHT: 55 IN

## 2020-10-27 DIAGNOSIS — H35.341: ICD-10-CM

## 2020-10-27 DIAGNOSIS — H35.353: ICD-10-CM

## 2020-10-27 DIAGNOSIS — H44.113: ICD-10-CM

## 2020-10-27 DIAGNOSIS — H35.3112: ICD-10-CM

## 2020-10-27 DIAGNOSIS — H30.93: ICD-10-CM

## 2020-10-27 DIAGNOSIS — H35.3222: ICD-10-CM

## 2020-10-27 DIAGNOSIS — H40.052: ICD-10-CM

## 2020-10-27 PROCEDURE — 67028 INJECTION EYE DRUG: CPT

## 2020-10-27 PROCEDURE — 92202 OPSCPY EXTND ON/MAC DRAW: CPT

## 2020-10-27 PROCEDURE — 92014 COMPRE OPH EXAM EST PT 1/>: CPT | Mod: 25

## 2020-10-27 PROCEDURE — 92134 CPTRZ OPH DX IMG PST SGM RTA: CPT

## 2020-10-27 ASSESSMENT — TONOMETRY
OS_IOP_MMHG: 21
OD_IOP_MMHG: 26

## 2020-10-27 ASSESSMENT — VISUAL ACUITY
OS_SC: 20/30-2
OD_SC: 20/30
OD_PH: 20/30+2

## 2020-12-09 ENCOUNTER — FOLLOW UP (OUTPATIENT)
Dept: URBAN - METROPOLITAN AREA CLINIC 32 | Facility: CLINIC | Age: 65
End: 2020-12-09

## 2020-12-09 VITALS — HEIGHT: 55 IN

## 2020-12-09 DIAGNOSIS — H44.113: ICD-10-CM

## 2020-12-09 DIAGNOSIS — H40.052: ICD-10-CM

## 2020-12-09 DIAGNOSIS — H35.3222: ICD-10-CM

## 2020-12-09 DIAGNOSIS — H35.341: ICD-10-CM

## 2020-12-09 DIAGNOSIS — H35.353: ICD-10-CM

## 2020-12-09 DIAGNOSIS — H30.93: ICD-10-CM

## 2020-12-09 DIAGNOSIS — H35.3112: ICD-10-CM

## 2020-12-09 PROCEDURE — 92014 COMPRE OPH EXAM EST PT 1/>: CPT

## 2020-12-09 PROCEDURE — 92134 CPTRZ OPH DX IMG PST SGM RTA: CPT

## 2020-12-09 PROCEDURE — 92202 OPSCPY EXTND ON/MAC DRAW: CPT

## 2020-12-09 ASSESSMENT — VISUAL ACUITY
OS_SC: 20/30
OS_PH: 20/25
OD_PH: 20/25
OD_SC: 20/30-1

## 2020-12-09 ASSESSMENT — TONOMETRY
OD_IOP_MMHG: 22
OS_IOP_MMHG: 22

## 2021-01-12 ENCOUNTER — FOLLOW UP (OUTPATIENT)
Dept: URBAN - METROPOLITAN AREA CLINIC 32 | Facility: CLINIC | Age: 66
End: 2021-01-12

## 2021-01-12 VITALS — HEIGHT: 55 IN

## 2021-01-12 DIAGNOSIS — H35.353: ICD-10-CM

## 2021-01-12 DIAGNOSIS — H40.052: ICD-10-CM

## 2021-01-12 DIAGNOSIS — H30.93: ICD-10-CM

## 2021-01-12 DIAGNOSIS — H35.3222: ICD-10-CM

## 2021-01-12 DIAGNOSIS — H35.3112: ICD-10-CM

## 2021-01-12 DIAGNOSIS — H44.113: ICD-10-CM

## 2021-01-12 DIAGNOSIS — H35.341: ICD-10-CM

## 2021-01-12 DIAGNOSIS — H35.373: ICD-10-CM

## 2021-01-12 PROCEDURE — 92202 OPSCPY EXTND ON/MAC DRAW: CPT

## 2021-01-12 PROCEDURE — 92134 CPTRZ OPH DX IMG PST SGM RTA: CPT

## 2021-01-12 PROCEDURE — 92014 COMPRE OPH EXAM EST PT 1/>: CPT

## 2021-01-12 ASSESSMENT — TONOMETRY
OD_IOP_MMHG: 21
OS_IOP_MMHG: 21

## 2021-01-12 ASSESSMENT — VISUAL ACUITY
OS_SC: 20/30-1
OD_SC: 20/30-3

## 2021-01-19 ENCOUNTER — PROCEDURE ONLY (OUTPATIENT)
Dept: URBAN - METROPOLITAN AREA CLINIC 32 | Facility: CLINIC | Age: 66
End: 2021-01-19

## 2021-01-19 VITALS — HEIGHT: 55 IN

## 2021-01-19 DIAGNOSIS — H35.353: ICD-10-CM

## 2021-01-19 PROCEDURE — 67028 INJECTION EYE DRUG: CPT

## 2021-01-19 ASSESSMENT — VISUAL ACUITY
OS_PH: 20/30-3
OS_SC: 20/30+2
OD_PH: 20/20
OD_SC: 20/25-2

## 2021-01-19 ASSESSMENT — TONOMETRY
OS_IOP_MMHG: 27
OS_IOP_MMHG: 26
OD_IOP_MMHG: 27
OD_IOP_MMHG: 23

## 2021-01-20 ENCOUNTER — PROBLEM (OUTPATIENT)
Dept: URBAN - METROPOLITAN AREA CLINIC 32 | Facility: CLINIC | Age: 66
End: 2021-01-20

## 2021-01-20 VITALS — HEIGHT: 55 IN

## 2021-01-20 DIAGNOSIS — H35.3112: ICD-10-CM

## 2021-01-20 DIAGNOSIS — H25.12: ICD-10-CM

## 2021-01-20 DIAGNOSIS — H35.373: ICD-10-CM

## 2021-01-20 DIAGNOSIS — H44.113: ICD-10-CM

## 2021-01-20 DIAGNOSIS — H35.341: ICD-10-CM

## 2021-01-20 DIAGNOSIS — H35.353: ICD-10-CM

## 2021-01-20 DIAGNOSIS — H40.052: ICD-10-CM

## 2021-01-20 DIAGNOSIS — D86.9: ICD-10-CM

## 2021-01-20 DIAGNOSIS — H43.812: ICD-10-CM

## 2021-01-20 DIAGNOSIS — H35.3222: ICD-10-CM

## 2021-01-20 DIAGNOSIS — H25.811: ICD-10-CM

## 2021-01-20 DIAGNOSIS — H30.93: ICD-10-CM

## 2021-01-20 PROCEDURE — 92134 CPTRZ OPH DX IMG PST SGM RTA: CPT

## 2021-01-20 PROCEDURE — 92014 COMPRE OPH EXAM EST PT 1/>: CPT

## 2021-01-20 PROCEDURE — 92202 OPSCPY EXTND ON/MAC DRAW: CPT

## 2021-01-20 ASSESSMENT — VISUAL ACUITY
OS_SC: 10/200
OD_SC: 20/40
OD_PH: 20/25

## 2021-01-20 ASSESSMENT — TONOMETRY: OD_IOP_MMHG: 18

## 2021-04-28 ENCOUNTER — FOLLOW UP (OUTPATIENT)
Dept: URBAN - METROPOLITAN AREA CLINIC 32 | Facility: CLINIC | Age: 66
End: 2021-04-28

## 2021-04-28 DIAGNOSIS — H30.93: ICD-10-CM

## 2021-04-28 DIAGNOSIS — H35.353: ICD-10-CM

## 2021-04-28 DIAGNOSIS — H40.052: ICD-10-CM

## 2021-04-28 DIAGNOSIS — H35.3222: ICD-10-CM

## 2021-04-28 DIAGNOSIS — H44.113: ICD-10-CM

## 2021-04-28 DIAGNOSIS — H35.3112: ICD-10-CM

## 2021-04-28 PROCEDURE — 92202 OPSCPY EXTND ON/MAC DRAW: CPT

## 2021-04-28 PROCEDURE — 92014 COMPRE OPH EXAM EST PT 1/>: CPT

## 2021-04-28 PROCEDURE — 92134 CPTRZ OPH DX IMG PST SGM RTA: CPT

## 2021-04-28 ASSESSMENT — VISUAL ACUITY
OS_SC: 20/30
OS_PH: 20/20-3
OD_SC: 20/30+2

## 2021-04-28 ASSESSMENT — TONOMETRY
OD_IOP_MMHG: 22
OS_IOP_MMHG: 16

## 2021-06-09 ENCOUNTER — FOLLOW UP (OUTPATIENT)
Dept: URBAN - METROPOLITAN AREA CLINIC 32 | Facility: CLINIC | Age: 66
End: 2021-06-09

## 2021-06-09 VITALS — HEIGHT: 55 IN

## 2021-06-09 DIAGNOSIS — H35.353: ICD-10-CM

## 2021-06-09 DIAGNOSIS — H30.93: ICD-10-CM

## 2021-06-09 DIAGNOSIS — H40.052: ICD-10-CM

## 2021-06-09 PROCEDURE — 92134 CPTRZ OPH DX IMG PST SGM RTA: CPT

## 2021-06-09 PROCEDURE — 92202 OPSCPY EXTND ON/MAC DRAW: CPT

## 2021-06-09 PROCEDURE — 92014 COMPRE OPH EXAM EST PT 1/>: CPT | Mod: 25

## 2021-06-09 PROCEDURE — 67028 INJECTION EYE DRUG: CPT

## 2021-06-09 ASSESSMENT — VISUAL ACUITY
OS_SC: 20/60-1
OD_PH: 20/30
OD_SC: 20/40
OS_PH: 20/40

## 2021-06-09 ASSESSMENT — TONOMETRY
OD_IOP_MMHG: 18
OS_IOP_MMHG: 14

## 2021-07-21 ENCOUNTER — FOLLOW UP (OUTPATIENT)
Dept: URBAN - METROPOLITAN AREA CLINIC 32 | Facility: CLINIC | Age: 66
End: 2021-07-21

## 2021-07-21 DIAGNOSIS — H40.052: ICD-10-CM

## 2021-07-21 DIAGNOSIS — H35.353: ICD-10-CM

## 2021-07-21 DIAGNOSIS — H30.93: ICD-10-CM

## 2021-07-21 PROCEDURE — 92014 COMPRE OPH EXAM EST PT 1/>: CPT

## 2021-07-21 PROCEDURE — 92202 OPSCPY EXTND ON/MAC DRAW: CPT

## 2021-07-21 PROCEDURE — 92134 CPTRZ OPH DX IMG PST SGM RTA: CPT

## 2021-07-21 ASSESSMENT — VISUAL ACUITY
OD_SC: 20/60-1
OS_SC: 20/40-2
OS_PH: 20/30
OD_PH: 20/30

## 2021-07-21 ASSESSMENT — TONOMETRY
OD_IOP_MMHG: 14
OS_IOP_MMHG: 16

## 2021-09-15 ENCOUNTER — FOLLOW UP (OUTPATIENT)
Dept: URBAN - METROPOLITAN AREA CLINIC 32 | Facility: CLINIC | Age: 66
End: 2021-09-15

## 2021-09-15 DIAGNOSIS — H35.3112: ICD-10-CM

## 2021-09-15 DIAGNOSIS — H44.113: ICD-10-CM

## 2021-09-15 DIAGNOSIS — H30.93: ICD-10-CM

## 2021-09-15 DIAGNOSIS — H35.3222: ICD-10-CM

## 2021-09-15 DIAGNOSIS — H35.353: ICD-10-CM

## 2021-09-15 DIAGNOSIS — H40.052: ICD-10-CM

## 2021-09-15 PROCEDURE — 92134 CPTRZ OPH DX IMG PST SGM RTA: CPT

## 2021-09-15 PROCEDURE — 92202 OPSCPY EXTND ON/MAC DRAW: CPT

## 2021-09-15 PROCEDURE — 92014 COMPRE OPH EXAM EST PT 1/>: CPT

## 2021-09-15 ASSESSMENT — VISUAL ACUITY
OS_PH: 20/40+2
OD_PH: 20/25
OD_SC: 20/40-1
OS_SC: 20/50-3

## 2021-09-15 ASSESSMENT — TONOMETRY
OD_IOP_MMHG: 26
OS_IOP_MMHG: 20

## 2021-11-17 ENCOUNTER — FOLLOW UP (OUTPATIENT)
Dept: URBAN - METROPOLITAN AREA CLINIC 32 | Facility: CLINIC | Age: 66
End: 2021-11-17

## 2021-11-17 DIAGNOSIS — H35.353: ICD-10-CM

## 2021-11-17 DIAGNOSIS — H40.052: ICD-10-CM

## 2021-11-17 DIAGNOSIS — H30.93: ICD-10-CM

## 2021-11-17 PROCEDURE — 67028 INJECTION EYE DRUG: CPT

## 2021-11-17 PROCEDURE — 92134 CPTRZ OPH DX IMG PST SGM RTA: CPT

## 2021-11-17 PROCEDURE — 92202 OPSCPY EXTND ON/MAC DRAW: CPT

## 2021-11-17 PROCEDURE — 92014 COMPRE OPH EXAM EST PT 1/>: CPT | Mod: 25

## 2021-11-17 ASSESSMENT — VISUAL ACUITY
OD_PH: 20/25
OS_SC: 20/60
OS_PH: 20/40
OD_SC: 20/50-1

## 2021-11-17 ASSESSMENT — TONOMETRY
OD_IOP_MMHG: 27
OS_IOP_MMHG: 24

## 2021-12-08 ENCOUNTER — FOLLOW UP (OUTPATIENT)
Dept: URBAN - METROPOLITAN AREA CLINIC 32 | Facility: CLINIC | Age: 66
End: 2021-12-08

## 2021-12-08 DIAGNOSIS — H35.353: ICD-10-CM

## 2021-12-08 DIAGNOSIS — H40.052: ICD-10-CM

## 2021-12-08 DIAGNOSIS — H30.93: ICD-10-CM

## 2021-12-08 PROCEDURE — 67028 INJECTION EYE DRUG: CPT

## 2021-12-08 PROCEDURE — 92202 OPSCPY EXTND ON/MAC DRAW: CPT

## 2021-12-08 PROCEDURE — 92134 CPTRZ OPH DX IMG PST SGM RTA: CPT

## 2021-12-08 PROCEDURE — 92014 COMPRE OPH EXAM EST PT 1/>: CPT | Mod: 25

## 2021-12-08 ASSESSMENT — TONOMETRY
OD_IOP_MMHG: 24
OD_IOP_MMHG: 23
OS_IOP_MMHG: 18

## 2021-12-08 ASSESSMENT — VISUAL ACUITY
OD_PH: 20/25
OD_SC: 20/50
OS_SC: 20/50-2

## 2021-12-22 ENCOUNTER — FOLLOW UP (OUTPATIENT)
Dept: URBAN - METROPOLITAN AREA CLINIC 32 | Facility: CLINIC | Age: 66
End: 2021-12-22

## 2021-12-22 DIAGNOSIS — H40.052: ICD-10-CM

## 2021-12-22 DIAGNOSIS — H30.93: ICD-10-CM

## 2021-12-22 DIAGNOSIS — H35.353: ICD-10-CM

## 2021-12-22 PROCEDURE — 92014 COMPRE OPH EXAM EST PT 1/>: CPT

## 2021-12-22 PROCEDURE — 92202 OPSCPY EXTND ON/MAC DRAW: CPT

## 2021-12-22 PROCEDURE — 92134 CPTRZ OPH DX IMG PST SGM RTA: CPT

## 2021-12-22 ASSESSMENT — TONOMETRY
OD_IOP_MMHG: 23
OD_IOP_MMHG: 22
OS_IOP_MMHG: 24
OS_IOP_MMHG: 27

## 2021-12-22 ASSESSMENT — VISUAL ACUITY
OD_PH: 20/30-1
OD_SC: 20/40-2
OS_SC: 20/40

## 2022-03-16 ENCOUNTER — FOLLOW UP (OUTPATIENT)
Dept: URBAN - METROPOLITAN AREA CLINIC 32 | Facility: CLINIC | Age: 67
End: 2022-03-16

## 2022-03-16 DIAGNOSIS — H30.93: ICD-10-CM

## 2022-03-16 DIAGNOSIS — H35.353: ICD-10-CM

## 2022-03-16 DIAGNOSIS — H40.052: ICD-10-CM

## 2022-03-16 PROCEDURE — 92134 CPTRZ OPH DX IMG PST SGM RTA: CPT

## 2022-03-16 PROCEDURE — 92014 COMPRE OPH EXAM EST PT 1/>: CPT

## 2022-03-16 PROCEDURE — 92202 OPSCPY EXTND ON/MAC DRAW: CPT

## 2022-03-16 ASSESSMENT — VISUAL ACUITY
OS_SC: 20/30
OD_PH: 20/20-1
OD_SC: 20/40
OS_PH: 20/25

## 2022-03-16 ASSESSMENT — TONOMETRY
OD_IOP_MMHG: 22
OS_IOP_MMHG: 22
OD_IOP_MMHG: 21
OS_IOP_MMHG: 25

## 2022-05-18 ENCOUNTER — FOLLOW UP (OUTPATIENT)
Dept: URBAN - METROPOLITAN AREA CLINIC 32 | Facility: CLINIC | Age: 67
End: 2022-05-18

## 2022-05-18 DIAGNOSIS — H35.353: ICD-10-CM

## 2022-05-18 DIAGNOSIS — H40.052: ICD-10-CM

## 2022-05-18 DIAGNOSIS — H30.93: ICD-10-CM

## 2022-05-18 PROCEDURE — 92014 COMPRE OPH EXAM EST PT 1/>: CPT

## 2022-05-18 PROCEDURE — 92134 CPTRZ OPH DX IMG PST SGM RTA: CPT

## 2022-05-18 PROCEDURE — 92202 OPSCPY EXTND ON/MAC DRAW: CPT

## 2022-05-18 ASSESSMENT — TONOMETRY
OS_IOP_MMHG: 17
OD_IOP_MMHG: 19

## 2022-05-18 ASSESSMENT — VISUAL ACUITY
OS_SC: 20/40-2
OD_PH: 20/30-3
OD_SC: 20/40

## 2022-06-14 ENCOUNTER — FOLLOW UP (OUTPATIENT)
Dept: URBAN - METROPOLITAN AREA CLINIC 32 | Facility: CLINIC | Age: 67
End: 2022-06-14

## 2022-06-14 DIAGNOSIS — H35.353: ICD-10-CM

## 2022-06-14 DIAGNOSIS — H30.93: ICD-10-CM

## 2022-06-14 DIAGNOSIS — H40.052: ICD-10-CM

## 2022-06-14 PROCEDURE — 92134 CPTRZ OPH DX IMG PST SGM RTA: CPT

## 2022-06-14 PROCEDURE — 67028 INJECTION EYE DRUG: CPT

## 2022-06-14 PROCEDURE — 92202 OPSCPY EXTND ON/MAC DRAW: CPT

## 2022-06-14 PROCEDURE — 92014 COMPRE OPH EXAM EST PT 1/>: CPT | Mod: 25

## 2022-06-14 ASSESSMENT — VISUAL ACUITY
OD_SC: 20/40-1
OS_SC: 20/60+2
OD_PH: 20/40
OS_PH: 20/50-2

## 2022-06-14 ASSESSMENT — TONOMETRY
OS_IOP_MMHG: 18
OD_IOP_MMHG: 17

## 2022-07-19 ENCOUNTER — FOLLOW UP (OUTPATIENT)
Dept: URBAN - METROPOLITAN AREA CLINIC 32 | Facility: CLINIC | Age: 67
End: 2022-07-19

## 2022-07-19 DIAGNOSIS — H35.353: ICD-10-CM

## 2022-07-19 DIAGNOSIS — H40.052: ICD-10-CM

## 2022-07-19 DIAGNOSIS — H30.93: ICD-10-CM

## 2022-07-19 PROCEDURE — 92014 COMPRE OPH EXAM EST PT 1/>: CPT

## 2022-07-19 PROCEDURE — 92134 CPTRZ OPH DX IMG PST SGM RTA: CPT

## 2022-07-19 PROCEDURE — 92202 OPSCPY EXTND ON/MAC DRAW: CPT

## 2022-07-19 ASSESSMENT — VISUAL ACUITY
OD_CC: 20/30
OS_CC: 20/40

## 2022-07-19 ASSESSMENT — TONOMETRY
OD_IOP_MMHG: 13
OS_IOP_MMHG: 13

## 2022-08-30 ENCOUNTER — FOLLOW UP (OUTPATIENT)
Dept: URBAN - METROPOLITAN AREA CLINIC 32 | Facility: CLINIC | Age: 67
End: 2022-08-30

## 2022-08-30 DIAGNOSIS — H30.93: ICD-10-CM

## 2022-08-30 DIAGNOSIS — H35.353: ICD-10-CM

## 2022-08-30 DIAGNOSIS — H40.052: ICD-10-CM

## 2022-08-30 PROCEDURE — 67028 INJECTION EYE DRUG: CPT

## 2022-08-30 PROCEDURE — 92014 COMPRE OPH EXAM EST PT 1/>: CPT | Mod: 25

## 2022-08-30 PROCEDURE — 92202 OPSCPY EXTND ON/MAC DRAW: CPT

## 2022-08-30 PROCEDURE — 92134 CPTRZ OPH DX IMG PST SGM RTA: CPT

## 2022-08-30 ASSESSMENT — VISUAL ACUITY
OD_SC: 20/30-1
OS_SC: 20/60-3

## 2022-08-30 ASSESSMENT — TONOMETRY
OS_IOP_MMHG: 19
OD_IOP_MMHG: 12

## 2022-10-05 ENCOUNTER — FOLLOW UP (OUTPATIENT)
Dept: URBAN - METROPOLITAN AREA CLINIC 32 | Facility: CLINIC | Age: 67
End: 2022-10-05

## 2022-10-05 DIAGNOSIS — H35.353: ICD-10-CM

## 2022-10-05 DIAGNOSIS — H40.052: ICD-10-CM

## 2022-10-05 DIAGNOSIS — H30.93: ICD-10-CM

## 2022-10-05 PROCEDURE — 92014 COMPRE OPH EXAM EST PT 1/>: CPT | Mod: 25

## 2022-10-05 PROCEDURE — 67028 INJECTION EYE DRUG: CPT

## 2022-10-05 PROCEDURE — 92134 CPTRZ OPH DX IMG PST SGM RTA: CPT

## 2022-10-05 PROCEDURE — 92202 OPSCPY EXTND ON/MAC DRAW: CPT

## 2022-10-05 ASSESSMENT — TONOMETRY
OD_IOP_MMHG: 19
OS_IOP_MMHG: 21

## 2022-10-05 ASSESSMENT — VISUAL ACUITY
OD_SC: 20/40-1
OD_PH: 20/30
OS_SC: 20/50-1

## 2022-10-14 NOTE — PATIENT DISCUSSION
Educated patient on findings. Nearly resolved corneal infiltrate without epi defect OD - discussed likely secondary to CL overwear (patient sleeps/wears CL 24/7). Prescribe Tobradex QID OD x 1 week then discontinue. Remain out of CLs x 1 week then may resume CL wear pending si/sx resolved. Discussed proper wear/care/hygiene of CLs including not sleeping in lenses to decrease risk of vision/eye threatening infections; patient expressed understanding. Advised to call/RTC if si/sx persist or worsen. Monitor.

## 2022-11-22 ENCOUNTER — FOLLOW UP (OUTPATIENT)
Dept: URBAN - METROPOLITAN AREA CLINIC 32 | Facility: CLINIC | Age: 67
End: 2022-11-22

## 2022-11-22 DIAGNOSIS — H40.052: ICD-10-CM

## 2022-11-22 DIAGNOSIS — H35.341: ICD-10-CM

## 2022-11-22 DIAGNOSIS — H35.353: ICD-10-CM

## 2022-11-22 DIAGNOSIS — H30.93: ICD-10-CM

## 2022-11-22 PROCEDURE — 92134 CPTRZ OPH DX IMG PST SGM RTA: CPT

## 2022-11-22 PROCEDURE — 92202 OPSCPY EXTND ON/MAC DRAW: CPT

## 2022-11-22 PROCEDURE — 92014 COMPRE OPH EXAM EST PT 1/>: CPT

## 2022-11-22 ASSESSMENT — TONOMETRY
OS_IOP_MMHG: 26
OD_IOP_MMHG: 16

## 2022-11-22 ASSESSMENT — VISUAL ACUITY
OD_SC: 20/30-2
OS_CC: 20/25-2
OD_PH: 20/25-2

## 2023-01-03 ENCOUNTER — FOLLOW UP (OUTPATIENT)
Dept: URBAN - METROPOLITAN AREA CLINIC 32 | Facility: CLINIC | Age: 68
End: 2023-01-03

## 2023-01-03 DIAGNOSIS — H30.93: ICD-10-CM

## 2023-01-03 DIAGNOSIS — H35.341: ICD-10-CM

## 2023-01-03 DIAGNOSIS — H35.353: ICD-10-CM

## 2023-01-03 DIAGNOSIS — H40.052: ICD-10-CM

## 2023-01-03 PROCEDURE — 92202 OPSCPY EXTND ON/MAC DRAW: CPT

## 2023-01-03 PROCEDURE — 92014 COMPRE OPH EXAM EST PT 1/>: CPT

## 2023-01-03 PROCEDURE — 92134 CPTRZ OPH DX IMG PST SGM RTA: CPT

## 2023-01-03 ASSESSMENT — TONOMETRY
OD_IOP_MMHG: 15
OS_IOP_MMHG: 12

## 2023-01-03 ASSESSMENT — VISUAL ACUITY
OS_PH: 20/60
OS_SC: 20/80-1
OD_SC: 20/50-3
OD_PH: 20/40-1

## 2023-01-31 ENCOUNTER — NEW PATIENT (OUTPATIENT)
Dept: URBAN - METROPOLITAN AREA CLINIC 33 | Facility: CLINIC | Age: 68
End: 2023-01-31

## 2023-01-31 VITALS
HEART RATE: 69 BPM | WEIGHT: 126 LBS | HEIGHT: 62 IN | DIASTOLIC BLOOD PRESSURE: 82 MMHG | SYSTOLIC BLOOD PRESSURE: 124 MMHG | BODY MASS INDEX: 23.19 KG/M2

## 2023-01-31 DIAGNOSIS — D31.32: ICD-10-CM

## 2023-01-31 DIAGNOSIS — H35.3222: ICD-10-CM

## 2023-01-31 DIAGNOSIS — H40.1110: ICD-10-CM

## 2023-01-31 DIAGNOSIS — H35.372: ICD-10-CM

## 2023-01-31 DIAGNOSIS — H30.93: ICD-10-CM

## 2023-01-31 DIAGNOSIS — H35.341: ICD-10-CM

## 2023-01-31 DIAGNOSIS — H02.834: ICD-10-CM

## 2023-01-31 DIAGNOSIS — H04.123: ICD-10-CM

## 2023-01-31 DIAGNOSIS — H02.831: ICD-10-CM

## 2023-01-31 DIAGNOSIS — H40.042: ICD-10-CM

## 2023-01-31 PROCEDURE — 92134 CPTRZ OPH DX IMG PST SGM RTA: CPT

## 2023-01-31 PROCEDURE — 99204 OFFICE O/P NEW MOD 45 MIN: CPT

## 2023-01-31 PROCEDURE — 92250 FUNDUS PHOTOGRAPHY W/I&R: CPT

## 2023-01-31 ASSESSMENT — VISUAL ACUITY
OS_SC: 20/40-2
OD_SC: 20/30-2

## 2023-01-31 ASSESSMENT — TONOMETRY
OS_IOP_MMHG: 12
OD_IOP_MMHG: 13

## 2023-05-16 ENCOUNTER — FOLLOW UP (OUTPATIENT)
Dept: URBAN - METROPOLITAN AREA CLINIC 32 | Facility: CLINIC | Age: 68
End: 2023-05-16

## 2023-05-16 DIAGNOSIS — D31.32: ICD-10-CM

## 2023-05-16 DIAGNOSIS — H30.93: ICD-10-CM

## 2023-05-16 DIAGNOSIS — H35.353: ICD-10-CM

## 2023-05-16 DIAGNOSIS — H35.341: ICD-10-CM

## 2023-05-16 PROCEDURE — 92134 CPTRZ OPH DX IMG PST SGM RTA: CPT

## 2023-05-16 PROCEDURE — 92202 OPSCPY EXTND ON/MAC DRAW: CPT

## 2023-05-16 PROCEDURE — 92014 COMPRE OPH EXAM EST PT 1/>: CPT

## 2023-05-16 ASSESSMENT — VISUAL ACUITY
OD_CC: 20/25-2
OS_CC: 20/20

## 2023-05-16 ASSESSMENT — TONOMETRY
OD_IOP_MMHG: 20
OS_IOP_MMHG: 15

## 2023-06-13 ENCOUNTER — FOLLOW UP (OUTPATIENT)
Dept: URBAN - METROPOLITAN AREA CLINIC 32 | Facility: CLINIC | Age: 68
End: 2023-06-13

## 2023-06-13 DIAGNOSIS — H30.93: ICD-10-CM

## 2023-06-13 DIAGNOSIS — H35.3222: ICD-10-CM

## 2023-06-13 DIAGNOSIS — H35.3112: ICD-10-CM

## 2023-06-13 DIAGNOSIS — H35.341: ICD-10-CM

## 2023-06-13 DIAGNOSIS — H35.353: ICD-10-CM

## 2023-06-13 PROCEDURE — 67028 INJECTION EYE DRUG: CPT

## 2023-06-13 PROCEDURE — 92202 OPSCPY EXTND ON/MAC DRAW: CPT | Mod: 59

## 2023-06-13 PROCEDURE — 92134 CPTRZ OPH DX IMG PST SGM RTA: CPT

## 2023-06-13 PROCEDURE — 92014 COMPRE OPH EXAM EST PT 1/>: CPT | Mod: 25

## 2023-06-13 ASSESSMENT — VISUAL ACUITY
OS_CC: 20/50-2
OD_CC: 20/40-1
OS_PH: 20/40-2
OD_PH: 20/30-2

## 2023-06-13 ASSESSMENT — TONOMETRY
OS_IOP_MMHG: 8
OD_IOP_MMHG: 17

## 2023-07-18 ENCOUNTER — FOLLOW UP (OUTPATIENT)
Dept: URBAN - METROPOLITAN AREA CLINIC 32 | Facility: CLINIC | Age: 68
End: 2023-07-18

## 2023-07-18 DIAGNOSIS — H30.93: ICD-10-CM

## 2023-07-18 DIAGNOSIS — H35.3222: ICD-10-CM

## 2023-07-18 DIAGNOSIS — H35.353: ICD-10-CM

## 2023-07-18 DIAGNOSIS — H35.3112: ICD-10-CM

## 2023-07-18 PROCEDURE — 92014 COMPRE OPH EXAM EST PT 1/>: CPT | Mod: 25

## 2023-07-18 PROCEDURE — 92134 CPTRZ OPH DX IMG PST SGM RTA: CPT

## 2023-07-18 PROCEDURE — 92202 OPSCPY EXTND ON/MAC DRAW: CPT | Mod: 59

## 2023-07-18 PROCEDURE — 67028 INJECTION EYE DRUG: CPT

## 2023-07-18 ASSESSMENT — VISUAL ACUITY
OD_CC: 20/30-2
OS_CC: 20/25-1

## 2023-07-18 ASSESSMENT — TONOMETRY
OD_IOP_MMHG: 15
OS_IOP_MMHG: 11

## 2023-09-06 ENCOUNTER — FOLLOW UP (OUTPATIENT)
Dept: URBAN - METROPOLITAN AREA CLINIC 32 | Facility: CLINIC | Age: 68
End: 2023-09-06

## 2023-09-06 DIAGNOSIS — H30.93: ICD-10-CM

## 2023-09-06 DIAGNOSIS — H35.3222: ICD-10-CM

## 2023-09-06 DIAGNOSIS — H35.353: ICD-10-CM

## 2023-09-06 DIAGNOSIS — H35.3112: ICD-10-CM

## 2023-09-06 PROCEDURE — 92134 CPTRZ OPH DX IMG PST SGM RTA: CPT

## 2023-09-06 PROCEDURE — 92014 COMPRE OPH EXAM EST PT 1/>: CPT

## 2023-09-06 PROCEDURE — 92202 OPSCPY EXTND ON/MAC DRAW: CPT

## 2023-09-06 ASSESSMENT — TONOMETRY
OS_IOP_MMHG: 12
OD_IOP_MMHG: 12

## 2023-09-06 ASSESSMENT — VISUAL ACUITY
OS_CC: 20/25+1
OD_CC: 20/25-3

## 2023-12-05 ENCOUNTER — FOLLOW UP (OUTPATIENT)
Dept: URBAN - METROPOLITAN AREA CLINIC 32 | Facility: CLINIC | Age: 68
End: 2023-12-05

## 2023-12-05 DIAGNOSIS — H35.353: ICD-10-CM

## 2023-12-05 DIAGNOSIS — H35.3112: ICD-10-CM

## 2023-12-05 DIAGNOSIS — H35.3222: ICD-10-CM

## 2023-12-05 PROCEDURE — 92014 COMPRE OPH EXAM EST PT 1/>: CPT

## 2023-12-05 PROCEDURE — 92202 OPSCPY EXTND ON/MAC DRAW: CPT

## 2023-12-05 PROCEDURE — 92134 CPTRZ OPH DX IMG PST SGM RTA: CPT

## 2023-12-05 ASSESSMENT — VISUAL ACUITY
OS_CC: 20/50+3
OD_CC: 20/30-2

## 2023-12-05 ASSESSMENT — TONOMETRY
OS_IOP_MMHG: 14
OD_IOP_MMHG: 17

## 2023-12-19 ENCOUNTER — FOLLOW UP (OUTPATIENT)
Dept: URBAN - METROPOLITAN AREA CLINIC 32 | Facility: CLINIC | Age: 68
End: 2023-12-19

## 2023-12-19 DIAGNOSIS — H35.3112: ICD-10-CM

## 2023-12-19 DIAGNOSIS — H35.3222: ICD-10-CM

## 2023-12-19 DIAGNOSIS — H35.353: ICD-10-CM

## 2023-12-19 PROCEDURE — 92202 OPSCPY EXTND ON/MAC DRAW: CPT

## 2023-12-19 PROCEDURE — 92014 COMPRE OPH EXAM EST PT 1/>: CPT

## 2023-12-19 PROCEDURE — 92134 CPTRZ OPH DX IMG PST SGM RTA: CPT

## 2023-12-19 ASSESSMENT — TONOMETRY
OS_IOP_MMHG: 15
OD_IOP_MMHG: 15

## 2023-12-19 ASSESSMENT — VISUAL ACUITY
OS_CC: 20/40-2
OD_CC: 20/30-2

## 2024-03-05 ENCOUNTER — FOLLOW UP (OUTPATIENT)
Dept: URBAN - METROPOLITAN AREA CLINIC 33 | Facility: CLINIC | Age: 69
End: 2024-03-05

## 2024-03-05 VITALS — WEIGHT: 125 LBS | HEIGHT: 64.5 IN | BODY MASS INDEX: 21.08 KG/M2

## 2024-03-05 DIAGNOSIS — D31.32: ICD-10-CM

## 2024-03-05 DIAGNOSIS — H35.341: ICD-10-CM

## 2024-03-05 DIAGNOSIS — H30.93: ICD-10-CM

## 2024-03-05 DIAGNOSIS — H40.1110: ICD-10-CM

## 2024-03-05 DIAGNOSIS — H04.123: ICD-10-CM

## 2024-03-05 DIAGNOSIS — H02.834: ICD-10-CM

## 2024-03-05 DIAGNOSIS — H40.042: ICD-10-CM

## 2024-03-05 DIAGNOSIS — H02.831: ICD-10-CM

## 2024-03-05 DIAGNOSIS — H35.3222: ICD-10-CM

## 2024-03-05 DIAGNOSIS — H35.372: ICD-10-CM

## 2024-03-05 PROCEDURE — 92250 FUNDUS PHOTOGRAPHY W/I&R: CPT

## 2024-03-05 PROCEDURE — 92134 CPTRZ OPH DX IMG PST SGM RTA: CPT

## 2024-03-05 PROCEDURE — 92014 COMPRE OPH EXAM EST PT 1/>: CPT

## 2024-03-05 ASSESSMENT — VISUAL ACUITY
OD_CC: 20/20-2
OS_CC: 20/40+2

## 2024-03-05 ASSESSMENT — TONOMETRY
OS_IOP_MMHG: 10
OD_IOP_MMHG: 11

## 2024-05-15 ENCOUNTER — FOLLOW UP (OUTPATIENT)
Dept: URBAN - METROPOLITAN AREA CLINIC 32 | Facility: CLINIC | Age: 69
End: 2024-05-15

## 2024-05-15 DIAGNOSIS — H35.353: ICD-10-CM

## 2024-05-15 DIAGNOSIS — H35.373: ICD-10-CM

## 2024-05-15 DIAGNOSIS — D31.32: ICD-10-CM

## 2024-05-15 DIAGNOSIS — H35.341: ICD-10-CM

## 2024-05-15 DIAGNOSIS — H40.052: ICD-10-CM

## 2024-05-15 DIAGNOSIS — H35.3112: ICD-10-CM

## 2024-05-15 DIAGNOSIS — H26.491: ICD-10-CM

## 2024-05-15 DIAGNOSIS — H30.93: ICD-10-CM

## 2024-05-15 DIAGNOSIS — H35.3222: ICD-10-CM

## 2024-05-15 PROCEDURE — 92202 OPSCPY EXTND ON/MAC DRAW: CPT

## 2024-05-15 PROCEDURE — 92134 CPTRZ OPH DX IMG PST SGM RTA: CPT

## 2024-05-15 PROCEDURE — 92014 COMPRE OPH EXAM EST PT 1/>: CPT

## 2024-05-15 ASSESSMENT — VISUAL ACUITY
OS_CC: 20/40
OD_CC: 20/30-2

## 2024-05-15 ASSESSMENT — TONOMETRY
OS_IOP_MMHG: 9
OD_IOP_MMHG: 16

## 2024-06-05 ENCOUNTER — FOLLOW UP (OUTPATIENT)
Dept: URBAN - METROPOLITAN AREA CLINIC 32 | Facility: CLINIC | Age: 69
End: 2024-06-05

## 2024-06-05 DIAGNOSIS — H40.052: ICD-10-CM

## 2024-06-05 DIAGNOSIS — H35.341: ICD-10-CM

## 2024-06-05 DIAGNOSIS — H35.3112: ICD-10-CM

## 2024-06-05 DIAGNOSIS — H30.93: ICD-10-CM

## 2024-06-05 DIAGNOSIS — H26.491: ICD-10-CM

## 2024-06-05 DIAGNOSIS — H35.373: ICD-10-CM

## 2024-06-05 DIAGNOSIS — H35.3222: ICD-10-CM

## 2024-06-05 DIAGNOSIS — D31.32: ICD-10-CM

## 2024-06-05 DIAGNOSIS — H35.353: ICD-10-CM

## 2024-06-05 PROCEDURE — 92202 OPSCPY EXTND ON/MAC DRAW: CPT

## 2024-06-05 PROCEDURE — 92014 COMPRE OPH EXAM EST PT 1/>: CPT

## 2024-06-05 PROCEDURE — 92134 CPTRZ OPH DX IMG PST SGM RTA: CPT

## 2024-06-05 ASSESSMENT — TONOMETRY
OS_IOP_MMHG: 9
OD_IOP_MMHG: 18

## 2024-06-05 ASSESSMENT — VISUAL ACUITY
OD_CC: 20/40+3
OS_PH: 20/40+3
OS_CC: 20/40-1

## 2024-10-15 ENCOUNTER — FOLLOW UP (OUTPATIENT)
Dept: URBAN - METROPOLITAN AREA CLINIC 33 | Facility: CLINIC | Age: 69
End: 2024-10-15

## 2024-10-15 DIAGNOSIS — D31.32: ICD-10-CM

## 2024-10-15 DIAGNOSIS — H02.831: ICD-10-CM

## 2024-10-15 DIAGNOSIS — H04.123: ICD-10-CM

## 2024-10-15 DIAGNOSIS — H35.372: ICD-10-CM

## 2024-10-15 DIAGNOSIS — H40.1110: ICD-10-CM

## 2024-10-15 DIAGNOSIS — Z96.1: ICD-10-CM

## 2024-10-15 DIAGNOSIS — H02.834: ICD-10-CM

## 2024-10-15 DIAGNOSIS — H35.3222: ICD-10-CM

## 2024-10-15 DIAGNOSIS — H30.93: ICD-10-CM

## 2024-10-15 DIAGNOSIS — H40.042: ICD-10-CM

## 2024-10-15 DIAGNOSIS — H35.352: ICD-10-CM

## 2024-10-15 DIAGNOSIS — H35.341: ICD-10-CM

## 2024-10-15 PROCEDURE — 92014 COMPRE OPH EXAM EST PT 1/>: CPT

## 2024-10-15 PROCEDURE — 92134 CPTRZ OPH DX IMG PST SGM RTA: CPT

## 2024-10-15 PROCEDURE — 92250 FUNDUS PHOTOGRAPHY W/I&R: CPT

## 2024-10-15 RX ORDER — KETOROLAC TROMETHAMINE 5 MG/ML: 1 SOLUTION OPHTHALMIC

## 2024-10-15 RX ORDER — PREDNISOLONE ACETATE 10 MG/ML: 1 SUSPENSION/ DROPS OPHTHALMIC TWICE A DAY

## 2024-11-19 ENCOUNTER — FOLLOW UP (OUTPATIENT)
Dept: URBAN - METROPOLITAN AREA CLINIC 33 | Facility: CLINIC | Age: 69
End: 2024-11-19

## 2024-11-19 DIAGNOSIS — H02.834: ICD-10-CM

## 2024-11-19 DIAGNOSIS — H40.1110: ICD-10-CM

## 2024-11-19 DIAGNOSIS — H35.341: ICD-10-CM

## 2024-11-19 DIAGNOSIS — H35.372: ICD-10-CM

## 2024-11-19 DIAGNOSIS — H02.831: ICD-10-CM

## 2024-11-19 DIAGNOSIS — D31.32: ICD-10-CM

## 2024-11-19 DIAGNOSIS — H35.352: ICD-10-CM

## 2024-11-19 DIAGNOSIS — H40.042: ICD-10-CM

## 2024-11-19 DIAGNOSIS — H35.3222: ICD-10-CM

## 2024-11-19 DIAGNOSIS — H04.123: ICD-10-CM

## 2024-11-19 DIAGNOSIS — H30.93: ICD-10-CM

## 2024-11-19 DIAGNOSIS — Z96.1: ICD-10-CM

## 2024-11-19 PROCEDURE — 92014 COMPRE OPH EXAM EST PT 1/>: CPT | Mod: 25

## 2024-11-19 PROCEDURE — 67028 INJECTION EYE DRUG: CPT

## 2024-11-19 PROCEDURE — 92134 CPTRZ OPH DX IMG PST SGM RTA: CPT

## 2024-11-19 PROCEDURE — 92250 FUNDUS PHOTOGRAPHY W/I&R: CPT

## 2024-12-30 ENCOUNTER — FOLLOW UP (OUTPATIENT)
Age: 69
End: 2024-12-30

## 2024-12-30 DIAGNOSIS — H40.1110: ICD-10-CM

## 2024-12-30 DIAGNOSIS — H02.834: ICD-10-CM

## 2024-12-30 DIAGNOSIS — H04.123: ICD-10-CM

## 2024-12-30 DIAGNOSIS — Z96.1: ICD-10-CM

## 2024-12-30 DIAGNOSIS — H40.042: ICD-10-CM

## 2024-12-30 DIAGNOSIS — H35.372: ICD-10-CM

## 2024-12-30 DIAGNOSIS — D31.32: ICD-10-CM

## 2024-12-30 DIAGNOSIS — H02.831: ICD-10-CM

## 2024-12-30 DIAGNOSIS — H02.403: ICD-10-CM

## 2024-12-30 DIAGNOSIS — H35.352: ICD-10-CM

## 2024-12-30 DIAGNOSIS — H35.341: ICD-10-CM

## 2024-12-30 DIAGNOSIS — H30.93: ICD-10-CM

## 2024-12-30 DIAGNOSIS — H35.3222: ICD-10-CM

## 2024-12-30 PROCEDURE — 92134 CPTRZ OPH DX IMG PST SGM RTA: CPT

## 2024-12-30 PROCEDURE — 99213 OFFICE O/P EST LOW 20 MIN: CPT

## 2024-12-30 PROCEDURE — 92250 FUNDUS PHOTOGRAPHY W/I&R: CPT

## 2025-04-29 ENCOUNTER — FOLLOW UP (OUTPATIENT)
Age: 70
End: 2025-04-29

## 2025-04-29 VITALS — HEIGHT: 64 IN | BODY MASS INDEX: 21.85 KG/M2 | WEIGHT: 128 LBS

## 2025-04-29 DIAGNOSIS — H02.834: ICD-10-CM

## 2025-04-29 DIAGNOSIS — H02.831: ICD-10-CM

## 2025-04-29 DIAGNOSIS — H02.403: ICD-10-CM

## 2025-04-29 DIAGNOSIS — H35.352: ICD-10-CM

## 2025-04-29 DIAGNOSIS — Z96.1: ICD-10-CM

## 2025-04-29 DIAGNOSIS — H40.1110: ICD-10-CM

## 2025-04-29 DIAGNOSIS — D31.32: ICD-10-CM

## 2025-04-29 DIAGNOSIS — H04.123: ICD-10-CM

## 2025-04-29 DIAGNOSIS — H35.341: ICD-10-CM

## 2025-04-29 DIAGNOSIS — H35.372: ICD-10-CM

## 2025-04-29 DIAGNOSIS — H30.93: ICD-10-CM

## 2025-04-29 DIAGNOSIS — H35.3222: ICD-10-CM

## 2025-04-29 DIAGNOSIS — H40.042: ICD-10-CM

## 2025-04-29 PROCEDURE — 92250 FUNDUS PHOTOGRAPHY W/I&R: CPT

## 2025-04-29 PROCEDURE — 67028 INJECTION EYE DRUG: CPT | Mod: 25,LT

## 2025-04-29 PROCEDURE — 92134 CPTRZ OPH DX IMG PST SGM RTA: CPT

## 2025-04-29 PROCEDURE — 92014 COMPRE OPH EXAM EST PT 1/>: CPT

## 2025-06-16 ENCOUNTER — FOLLOW UP (OUTPATIENT)
Age: 70
End: 2025-06-16

## 2025-06-16 DIAGNOSIS — Z96.1: ICD-10-CM

## 2025-06-16 DIAGNOSIS — H40.1110: ICD-10-CM

## 2025-06-16 DIAGNOSIS — H35.3222: ICD-10-CM

## 2025-06-16 DIAGNOSIS — H35.341: ICD-10-CM

## 2025-06-16 DIAGNOSIS — D31.32: ICD-10-CM

## 2025-06-16 DIAGNOSIS — H35.372: ICD-10-CM

## 2025-06-16 DIAGNOSIS — H02.834: ICD-10-CM

## 2025-06-16 DIAGNOSIS — H04.123: ICD-10-CM

## 2025-06-16 DIAGNOSIS — H35.352: ICD-10-CM

## 2025-06-16 DIAGNOSIS — H02.403: ICD-10-CM

## 2025-06-16 DIAGNOSIS — H30.93: ICD-10-CM

## 2025-06-16 DIAGNOSIS — H02.831: ICD-10-CM

## 2025-06-16 DIAGNOSIS — H40.042: ICD-10-CM

## 2025-06-16 PROCEDURE — 92134 CPTRZ OPH DX IMG PST SGM RTA: CPT

## 2025-06-16 PROCEDURE — 92250 FUNDUS PHOTOGRAPHY W/I&R: CPT

## 2025-06-16 PROCEDURE — 99213 OFFICE O/P EST LOW 20 MIN: CPT

## 2025-08-12 ENCOUNTER — COMPREHENSIVE EXAM (OUTPATIENT)
Age: 70
End: 2025-08-12

## 2025-08-12 VITALS — WEIGHT: 125 LBS | HEIGHT: 64 IN | BODY MASS INDEX: 21.34 KG/M2

## 2025-08-12 DIAGNOSIS — Z96.1: ICD-10-CM

## 2025-08-12 DIAGNOSIS — H40.1110: ICD-10-CM

## 2025-08-12 DIAGNOSIS — H30.93: ICD-10-CM

## 2025-08-12 DIAGNOSIS — H02.831: ICD-10-CM

## 2025-08-12 DIAGNOSIS — H35.372: ICD-10-CM

## 2025-08-12 DIAGNOSIS — H35.3222: ICD-10-CM

## 2025-08-12 DIAGNOSIS — H02.403: ICD-10-CM

## 2025-08-12 DIAGNOSIS — H35.341: ICD-10-CM

## 2025-08-12 DIAGNOSIS — H04.123: ICD-10-CM

## 2025-08-12 DIAGNOSIS — D31.32: ICD-10-CM

## 2025-08-12 DIAGNOSIS — H35.352: ICD-10-CM

## 2025-08-12 DIAGNOSIS — H02.834: ICD-10-CM

## 2025-08-12 DIAGNOSIS — H40.042: ICD-10-CM

## 2025-08-12 PROCEDURE — 92250 FUNDUS PHOTOGRAPHY W/I&R: CPT

## 2025-08-12 PROCEDURE — 99213 OFFICE O/P EST LOW 20 MIN: CPT

## 2025-08-12 PROCEDURE — 92134 CPTRZ OPH DX IMG PST SGM RTA: CPT

## (undated) RX ORDER — DUREZOL 0.5 MG/ML: 1 EMULSION OPHTHALMIC

## (undated) RX ORDER — TIMOLOL MALEATE 2.5 MG/ML: 1 SOLUTION OPHTHALMIC TWICE A DAY

## (undated) RX ORDER — BROMFENAC SODIUM 0.7 MG/ML: 1 SOLUTION/ DROPS OPHTHALMIC TWICE A DAY

## (undated) RX ORDER — KETOROLAC TROMETHAMINE 5 MG/ML: 1 SOLUTION OPHTHALMIC

## (undated) RX ORDER — PREDNISOLONE ACETATE 10 MG/ML
1 SUSPENSION/ DROPS OPHTHALMIC
Start: 2024-05-15

## (undated) RX ORDER — PREDNISOLONE ACETATE 10 MG/ML: 1 SUSPENSION/ DROPS OPHTHALMIC

## (undated) RX ORDER — PREDNISOLONE ACETATE 10 MG/ML: 1 SUSPENSION/ DROPS OPHTHALMIC TWICE A DAY